# Patient Record
Sex: FEMALE | Race: WHITE | Employment: STUDENT | URBAN - METROPOLITAN AREA
[De-identification: names, ages, dates, MRNs, and addresses within clinical notes are randomized per-mention and may not be internally consistent; named-entity substitution may affect disease eponyms.]

---
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
## 2021-12-08 NOTE — TELEPHONE ENCOUNTER
Patient indicates his right elbow hurts bad and is losing feeling on ring finger on right side. Patient still has Dr. Kajal Amador as his pcp and declined contacting fm/im due to insurance not having any of the provider listed as pcp. Please call at [phone].   *Patient updated legal name change

## 2021-12-10 ENCOUNTER — OFFICE VISIT (OUTPATIENT)
Dept: PEDIATRICS CLINIC | Facility: CLINIC | Age: 21
End: 2021-12-10
Payer: MEDICAID

## 2021-12-10 VITALS
DIASTOLIC BLOOD PRESSURE: 56 MMHG | TEMPERATURE: 99 F | HEART RATE: 59 BPM | WEIGHT: 137.13 LBS | SYSTOLIC BLOOD PRESSURE: 109 MMHG | BODY MASS INDEX: 21 KG/M2

## 2021-12-10 DIAGNOSIS — M77.11 LATERAL EPICONDYLITIS OF RIGHT ELBOW: Primary | ICD-10-CM

## 2021-12-10 PROCEDURE — 99213 OFFICE O/P EST LOW 20 MIN: CPT | Performed by: PEDIATRICS

## 2021-12-10 PROCEDURE — 3074F SYST BP LT 130 MM HG: CPT | Performed by: PEDIATRICS

## 2021-12-10 PROCEDURE — 3078F DIAST BP <80 MM HG: CPT | Performed by: PEDIATRICS

## 2021-12-10 RX ORDER — DICLOFENAC SODIUM 75 MG/1
75 TABLET, DELAYED RELEASE ORAL 2 TIMES DAILY
Qty: 60 TABLET | Refills: 0 | Status: SHIPPED | OUTPATIENT
Start: 2021-12-10 | End: 2022-01-09

## 2021-12-10 NOTE — PROGRESS NOTES
Pauline Kumar is a 24year old female who was brought in for this visit. History was provided by the patient  HPI:   Patient presents with:  Elbow Pain: Rt elbow x 2 weeks. Loosing feeling on R ring finger.   no trauma scoops ice cream  escitalo

## 2021-12-13 ENCOUNTER — TELEPHONE (OUTPATIENT)
Dept: PEDIATRICS CLINIC | Facility: CLINIC | Age: 21
End: 2021-12-13

## 2021-12-13 NOTE — TELEPHONE ENCOUNTER
Pt is calling back to Dr. Ela Rudolph for an Ortho referral.  Pt was seen last Friday and is still feeling discomfort.     Please advise

## 2022-06-09 NOTE — TELEPHONE ENCOUNTER
Gabino Vazquez from [facility] wanted to inform DMM that pt was admitted to [facility] on 2/25 for major depressive disorder and currently still admitted.  Please advise None

## (undated) NOTE — MR AVS SNAPSHOT
[facility] [address] [address] [address] [facility]  [phone]               Thank you for choosing us for your health care visit with Jose Daniel Roche. DO Bac[place].   We are glad to serve you and happy to provide you KETONES (URINE DIPSTICK) Negative Negative mg/dL    SPECIFIC GRAVITY 1.010 1.005 - 1.030    OCCULT BLOOD Negative Negative    PH, URINE 7 4.5 - 8.0    PROTEIN (URINE DIPSTICK) Negative Negative/Trace mg/dL    UROBILINOGEN,SEMI-QN Negative 0.0 - 1.9 mg/dL o Be role models themselves by making healthy eating and daily physical activity the norm for their family.   o Create a home where healthy choices are available and encouraged  o Make it fun – find ways to engage your children such as:  o playing a game of

## (undated) NOTE — LETTER
VACCINE ADMINISTRATION RECORD  PARENT / GUARDIAN APPROVAL  Date: 2019  Vaccine administered to: Chey Boston     : 2000    MRN: [id number]    A copy of the appropriate Centers for Disease Control and Prevention Vaccine Information stat

## (undated) NOTE — LETTER
2018             Patient: Rebecca García Manjit [address] [facility] [address]         2000        To: [facility] Department-Behavioral Services  ATTN: Psychiatric Nursing    Current Outpatient Medication

## (undated) NOTE — MR AVS SNAPSHOT
[address] [address] [address] [facility]  [phone]               Thank you for choosing us for your health care visit with Deborah Caro. DO Bacilio.   We are glad to serve you and happy to provide you healthy? Make sure to talk to your teen about who his or her friends are and how they spend time together. Peer pressure can be a problem among teenagers. · Life at home. How is your child’s behavior? Does he or she get along with others in the family?  Is how to spend free time. You can’t always have the final say, but you can encourage healthy habits. Your teen should:  · Get at least 30 minutes to 60 minutes of physical activity every day. This time can be broken up throughout the day.  After-school sports · Let the health care provider know if you or your teen have questions about hygiene or acne. · Bring your teen to the dentist at least twice a year for teeth cleaning and a checkup. · Remind your teen to brush and floss his or her teeth before bed.   Sle driving. Teach your teen to always wear a seat belt, drive the speed limit, and follow the rules of the road. Do not allow your teenager to text or talk on a cell phone while driving. (And don’t do this yourself!  Remember, you set an example.)  · Set rules eased. Offer your love and support.  If your teen talks about death or suicide, seek help right away.      Next checkup at: _______________________________     PARENT NOTES:  Date Last Reviewed: 10/2/2014  © 0983-7816 [employer] [facility] 36-47 lbs               7.5 ml                       3                              1&1/2  48-59 lbs               10 ml                        4                              2                       1  60-71 lbs               12.5 ml                     5 4               2 tablets    Safety and Anticipatory Guidance  It is important that teenagers receive adequate amounts of sleep-at least 9 hours of uninterrupted sleep is recommended. Continue to encourage them to make smart decisions bob Starts to develop moral ideals and to select role models. If you have any concerns about your teen's development, check with your healthcare provider. Developed by [employer]. Published by [employer].   Last modified: 2010-07-28  Last reviewed: 2009-0

## (undated) NOTE — LETTER
[facility] [employer] [place] of Child Health Examination       Student's Name  [facility] Title                           Date     Signature HEALTH HISTORY          TO BE COMPLETED AND SIGNED BY PARENT/GUARDIAN AND VERIFIED BY HEALTH CARE PROVIDER    ALLERGIES  (Food, drug, insect, other)  Patient has no known allergies.  MEDICATION  (List all prescribed or taken on a regular basis.)    Current Bone/Joint problem/injury/scoliosis?    Yes   No  Parent/Guardian Signature                                          Date     PHYSICAL EXAMINATION REQUIREMENTS    Entire section below to be completed by MD/DO/APN/PA       PHYSICAL EXAMINATION REQUIREMENTS ( Urinalysis   Developmental Screening Tool     SYSTEM REVIEW Normal Comments/Follow-up/Needs  Normal Comments/Follow-up/Needs   Skin {YES:829::\"Yes\"}  Endocrine {YES:829::\"Yes\"}    Ears {YES:829::\"Yes\"}                      Screen result: Gastrointest PHYSICAL EDUCATION    {DMG_Y/N/MODIFIED:1369::\"Yes\"}      INTERSCHOLASTIC SPORTS   {BLANK, Y/N/MODIFIED:1483::\"Yes\"}   Physician/Advanced Practice Nurse/Physician Assistant performing examination  Print Name  Dede Gracia.  DO Bacilio

## (undated) NOTE — Clinical Note
VACCINE ADMINISTRATION RECORD  PARENT / GUARDIAN APPROVAL  Date: 2017  Vaccine administered to: Kirt Irwin     : 2000    MRN: [id number]    A copy of the appropriate Centers for Disease Control and Prevention Vaccine Information sta

## (undated) NOTE — LETTER
[address] [place] [address] [place] of Child Health Examination       Student's Name  [employer] [place] Title                           Date  7/8/2019   Signature HEALTH HISTORY          TO BE COMPLETED AND SIGNED BY PARENT/GUARDIAN AND VERIFIED BY HEALTH CARE PROVIDER    ALLERGIES  (Food, drug, insect, other)  Patient has no known allergies.  MEDICATION  (List all prescribed or taken on a regular basis.)    Current Bone/Joint problem/injury/scoliosis?    Yes   No  Parent/Guardian Signature                                          Date     PHYSICAL EXAMINATION REQUIREMENTS    Entire section below to be completed by MD/DO/APN/PA       PHYSICAL EXAMINATION REQUIREMENTS ( Ears Yes                      Screen result: Gastrointestinal Yes    Eyes Yes     Screen result:   Genito-Urinary Yes  LMP   Nose Yes  Neurological Yes    Throat Yes  Musculoskeletal Yes    Mouth/Dental Yes  Spinal examination Yes    Cardiovascular/HTN Yes Rev 11/15                                                                    Printed by the [employer]

## (undated) NOTE — Clinical Note
[facility] [employer] of Child Health Examination       Student's Name  Helene sultana Title                           Date    (If adding dates to the above immunization history section, put your initials by date(s) and sign here.)   ALTERNATIVE PROOF OF IMMUNITY   1 Review of patient's allergies indicates no known allergies. MEDICATION  (List all prescribed or taken on a regular basis.)  No current outpatient prescriptions on file. Diagnosis of asthma?   Child wakes during the night coughing  Yes       No   Yes DIABETES SCREENING  BMI>85% age/sex  No And any two of the following:  Family History         Yes           Ethnic Minority           No                  Signs of Insulin Resistance (hypertension, dyslipidemia, polycystic ovarian syndrome, acanthosis nigri Quick-relief  medication (e.g. Short Acting Beta Antagonist): No          Controller medication (e.g. inhaled corticosteroid):   No Other   NEEDS/MODIFICATIONS required in the school setting  None DIETARY Needs/Restrictions     None   SPECIAL INSTR

## (undated) NOTE — LETTER
[facility] [employer] of Child Health Examination       Student's Name  Helene sultana Title                           Date     Signature HEALTH HISTORY          TO BE COMPLETED AND SIGNED BY PARENT/GUARDIAN AND VERIFIED BY HEALTH CARE PROVIDER    ALLERGIES  (Food, drug, insect, other)  Patient has no known allergies.  MEDICATION  (List all prescribed or taken on a regular basis.)    Current PHYSICAL EXAMINATION REQUIREMENTS (head circumference if <33 years old):   /73   Pulse 70   Ht 5' 7\" (1.702 m)   Wt 64.9 kg (143 lb)   LMP 05/17/2018   Breastfeeding?  No   BMI 22.40 kg/m²     DIABETES SCREENING  BMI>85% age/sex  No And any two of t Cardiovascular/HTN Yes  Nutritional status Yes    Respiratory Yes                   Diagnosis of Asthma: No Mental Health Yes        Currently Prescribed Asthma Medication:            Quick-relief  medication (e.g. Short Acting Beta Antagonist):  No